# Patient Record
Sex: MALE | Race: WHITE | NOT HISPANIC OR LATINO | Employment: OTHER | URBAN - METROPOLITAN AREA
[De-identification: names, ages, dates, MRNs, and addresses within clinical notes are randomized per-mention and may not be internally consistent; named-entity substitution may affect disease eponyms.]

---

## 2020-05-17 PROBLEM — N20.0 NEPHROLITHIASIS: Status: ACTIVE | Noted: 2020-05-17

## 2020-05-17 PROBLEM — E78.5 MILD HYPERLIPIDEMIA: Status: ACTIVE | Noted: 2020-05-17

## 2020-05-17 PROBLEM — S61.401A NECROTIC WOUND OF RIGHT HAND: Status: RESOLVED | Noted: 2020-05-17 | Resolved: 2020-05-17

## 2020-05-17 PROBLEM — I73.9 PVD (PERIPHERAL VASCULAR DISEASE): Status: ACTIVE | Noted: 2020-05-17

## 2020-05-17 PROBLEM — S61.401A EXTENSOR TENDON LACERATION OF HAND WITH OPEN WOUND, RIGHT, INITIAL ENCOUNTER: Status: ACTIVE | Noted: 2020-05-17

## 2020-05-17 PROBLEM — R91.8 MASS OF LOWER LOBE OF LEFT LUNG: Status: ACTIVE | Noted: 2020-05-17

## 2020-05-17 PROBLEM — J44.1 COPD EXACERBATION: Status: ACTIVE | Noted: 2020-05-17

## 2020-05-17 PROBLEM — S22.31XD CLOSED FRACTURE OF ONE RIB OF RIGHT SIDE WITH ROUTINE HEALING: Status: ACTIVE | Noted: 2020-05-17

## 2020-05-17 PROBLEM — I10 ESSENTIAL HYPERTENSION: Status: ACTIVE | Noted: 2020-05-17
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
## 2020-05-17 PROBLEM — Z72.0 TOBACCO USE: Status: ACTIVE | Noted: 2020-05-17

## 2020-05-17 PROBLEM — S61.401A NECROTIC WOUND OF RIGHT HAND: Status: ACTIVE | Noted: 2020-05-17

## 2020-05-17 PROBLEM — E87.6 HYPOKALEMIA: Status: ACTIVE | Noted: 2020-05-17

## 2020-05-17 PROBLEM — S01.01XA SCALP LACERATION: Status: ACTIVE | Noted: 2020-05-17

## 2020-05-17 PROBLEM — S66.821A EXTENSOR TENDON LACERATION OF HAND WITH OPEN WOUND, RIGHT, INITIAL ENCOUNTER: Status: ACTIVE | Noted: 2020-05-17

## 2020-05-17 PROBLEM — I96 NECROTIC WOUND OF RIGHT HAND: Status: RESOLVED | Noted: 2020-05-17 | Resolved: 2020-05-17

## 2020-05-17 PROBLEM — J44.9 COPD, MILD: Status: ACTIVE | Noted: 2020-05-17

## 2020-05-17 PROBLEM — K21.9 GASTROESOPHAGEAL REFLUX DISEASE WITHOUT ESOPHAGITIS: Status: ACTIVE | Noted: 2020-05-17

## 2020-05-17 PROBLEM — I96 NECROTIC WOUND OF RIGHT HAND: Status: ACTIVE | Noted: 2020-05-17

## 2020-05-17 PROBLEM — F17.200 SMOKING: Status: ACTIVE | Noted: 2020-05-17

## 2020-05-18 PROBLEM — D64.9 SYMPTOMATIC ANEMIA: Status: ACTIVE | Noted: 2020-05-18

## 2020-05-18 PROBLEM — E87.6 HYPOKALEMIA: Status: RESOLVED | Noted: 2020-05-17 | Resolved: 2020-05-18

## 2020-05-18 PROBLEM — I10 ESSENTIAL HYPERTENSION: Chronic | Status: ACTIVE | Noted: 2020-05-17

## 2020-05-19 PROBLEM — S61.401A OPEN WOUND OF RIGHT HAND, INITIAL ENCOUNTER: Status: ACTIVE | Noted: 2020-05-19

## 2020-05-21 PROBLEM — D64.9 SYMPTOMATIC ANEMIA: Status: RESOLVED | Noted: 2020-05-18 | Resolved: 2020-05-21

## 2020-05-23 PROBLEM — N30.00 ACUTE CYSTITIS WITHOUT HEMATURIA: Status: ACTIVE | Noted: 2020-05-23

## 2020-06-01 ENCOUNTER — NURSE TRIAGE (OUTPATIENT)
Dept: ADMINISTRATIVE | Facility: CLINIC | Age: 80
End: 2020-06-01

## 2020-06-01 NOTE — TELEPHONE ENCOUNTER
Called patient on behalf of [facility]. Spoke to patient's family, they said he has not developed any fever, cough or shortness of breath since the procedure. They said he's been transferred to [facility].    Reason for Disposition   Follow-up call to recent contact and information only call, no triage required    Protocols used: INFORMATION ONLY CALL - NO TRIAGE-P-[place]

## 2020-06-03 PROBLEM — T86.829 COMPLICATION OF SKIN GRAFT: Status: ACTIVE | Noted: 2020-06-03

## 2020-06-04 PROBLEM — S61.401D: Status: ACTIVE | Noted: 2020-06-04

## 2020-06-04 PROBLEM — S66.821D: Status: ACTIVE | Noted: 2020-06-04
